# Patient Record
Sex: MALE | Race: BLACK OR AFRICAN AMERICAN | NOT HISPANIC OR LATINO | Employment: FULL TIME | URBAN - METROPOLITAN AREA
[De-identification: names, ages, dates, MRNs, and addresses within clinical notes are randomized per-mention and may not be internally consistent; named-entity substitution may affect disease eponyms.]

---

## 2023-11-27 ENCOUNTER — APPOINTMENT (OUTPATIENT)
Dept: RADIOLOGY | Facility: HOSPITAL | Age: 48
End: 2023-11-27
Payer: COMMERCIAL

## 2023-11-27 ENCOUNTER — HOSPITAL ENCOUNTER (EMERGENCY)
Facility: HOSPITAL | Age: 48
Discharge: HOME | End: 2023-11-27
Attending: EMERGENCY MEDICINE
Payer: COMMERCIAL

## 2023-11-27 VITALS
BODY MASS INDEX: 23.86 KG/M2 | RESPIRATION RATE: 16 BRPM | HEART RATE: 81 BPM | HEIGHT: 67 IN | TEMPERATURE: 98.4 F | OXYGEN SATURATION: 100 % | SYSTOLIC BLOOD PRESSURE: 132 MMHG | DIASTOLIC BLOOD PRESSURE: 86 MMHG | WEIGHT: 152 LBS

## 2023-11-27 DIAGNOSIS — M54.2 NECK PAIN WITHOUT INJURY: Primary | ICD-10-CM

## 2023-11-27 DIAGNOSIS — M54.6 ACUTE THORACIC BACK PAIN, UNSPECIFIED BACK PAIN LATERALITY: ICD-10-CM

## 2023-11-27 DIAGNOSIS — I10 DIASTOLIC HYPERTENSION: ICD-10-CM

## 2023-11-27 PROCEDURE — 2500000004 HC RX 250 GENERAL PHARMACY W/ HCPCS (ALT 636 FOR OP/ED): Performed by: EMERGENCY MEDICINE

## 2023-11-27 PROCEDURE — 2500000001 HC RX 250 WO HCPCS SELF ADMINISTERED DRUGS (ALT 637 FOR MEDICARE OP): Performed by: EMERGENCY MEDICINE

## 2023-11-27 PROCEDURE — 71046 X-RAY EXAM CHEST 2 VIEWS: CPT

## 2023-11-27 PROCEDURE — 2500000005 HC RX 250 GENERAL PHARMACY W/O HCPCS: Performed by: EMERGENCY MEDICINE

## 2023-11-27 PROCEDURE — 72050 X-RAY EXAM NECK SPINE 4/5VWS: CPT

## 2023-11-27 PROCEDURE — 96372 THER/PROPH/DIAG INJ SC/IM: CPT

## 2023-11-27 PROCEDURE — 99284 EMERGENCY DEPT VISIT MOD MDM: CPT | Mod: 25 | Performed by: EMERGENCY MEDICINE

## 2023-11-27 PROCEDURE — 94760 N-INVAS EAR/PLS OXIMETRY 1: CPT

## 2023-11-27 RX ORDER — IBUPROFEN 600 MG/1
600 TABLET ORAL EVERY 6 HOURS PRN
Qty: 20 TABLET | Refills: 0 | Status: SHIPPED | OUTPATIENT
Start: 2023-11-27 | End: 2023-12-02

## 2023-11-27 RX ORDER — METHOCARBAMOL 500 MG/1
500 TABLET, FILM COATED ORAL 3 TIMES DAILY
Qty: 15 TABLET | Refills: 0 | Status: SHIPPED | OUTPATIENT
Start: 2023-11-27 | End: 2023-12-02

## 2023-11-27 RX ORDER — ACETAMINOPHEN 325 MG/1
650 TABLET ORAL ONCE
Status: COMPLETED | OUTPATIENT
Start: 2023-11-27 | End: 2023-11-27

## 2023-11-27 RX ORDER — LIDOCAINE 50 MG/G
1 PATCH TOPICAL DAILY
Qty: 5 PATCH | Refills: 0 | Status: SHIPPED | OUTPATIENT
Start: 2023-11-27

## 2023-11-27 RX ORDER — ORPHENADRINE CITRATE 30 MG/ML
60 INJECTION INTRAMUSCULAR; INTRAVENOUS ONCE
Status: COMPLETED | OUTPATIENT
Start: 2023-11-27 | End: 2023-11-27

## 2023-11-27 RX ORDER — LIDOCAINE 560 MG/1
1 PATCH PERCUTANEOUS; TOPICAL; TRANSDERMAL ONCE
Status: DISCONTINUED | OUTPATIENT
Start: 2023-11-27 | End: 2023-11-27 | Stop reason: HOSPADM

## 2023-11-27 RX ORDER — IBUPROFEN 600 MG/1
600 TABLET ORAL ONCE
Status: COMPLETED | OUTPATIENT
Start: 2023-11-27 | End: 2023-11-27

## 2023-11-27 RX ADMIN — IBUPROFEN 600 MG: 600 TABLET, FILM COATED ORAL at 14:16

## 2023-11-27 RX ADMIN — PREDNISONE 60 MG: 10 TABLET ORAL at 14:18

## 2023-11-27 RX ADMIN — LIDOCAINE 1 PATCH: 4 PATCH TOPICAL at 14:16

## 2023-11-27 RX ADMIN — ORPHENADRINE CITRATE 60 MG: 60 INJECTION INTRAMUSCULAR; INTRAVENOUS at 14:16

## 2023-11-27 RX ADMIN — ACETAMINOPHEN 650 MG: 325 TABLET ORAL at 14:16

## 2023-11-27 ASSESSMENT — COLUMBIA-SUICIDE SEVERITY RATING SCALE - C-SSRS
2. HAVE YOU ACTUALLY HAD ANY THOUGHTS OF KILLING YOURSELF?: NO
1. IN THE PAST MONTH, HAVE YOU WISHED YOU WERE DEAD OR WISHED YOU COULD GO TO SLEEP AND NOT WAKE UP?: NO
6. HAVE YOU EVER DONE ANYTHING, STARTED TO DO ANYTHING, OR PREPARED TO DO ANYTHING TO END YOUR LIFE?: NO

## 2023-11-27 ASSESSMENT — PAIN SCALES - GENERAL: PAINLEVEL_OUTOF10: 10 - WORST POSSIBLE PAIN

## 2023-11-27 ASSESSMENT — LIFESTYLE VARIABLES
HAVE YOU EVER FELT YOU SHOULD CUT DOWN ON YOUR DRINKING: NO
REASON UNABLE TO ASSESS: NO
EVER HAD A DRINK FIRST THING IN THE MORNING TO STEADY YOUR NERVES TO GET RID OF A HANGOVER: NO
EVER FELT BAD OR GUILTY ABOUT YOUR DRINKING: NO
HAVE PEOPLE ANNOYED YOU BY CRITICIZING YOUR DRINKING: NO

## 2023-11-27 ASSESSMENT — PAIN DESCRIPTION - PAIN TYPE: TYPE: ACUTE PAIN

## 2023-11-27 ASSESSMENT — PAIN DESCRIPTION - ORIENTATION: ORIENTATION: MID

## 2023-11-27 ASSESSMENT — PAIN - FUNCTIONAL ASSESSMENT: PAIN_FUNCTIONAL_ASSESSMENT: 0-10

## 2023-11-27 ASSESSMENT — PAIN DESCRIPTION - LOCATION: LOCATION: NECK

## 2023-11-27 NOTE — ED PROVIDER NOTES
HPI   No chief complaint on file.      HPI                    No data recorded                Patient History   Past Medical History:   Diagnosis Date    Acute sinusitis, unspecified 02/15/2019    Acute non-recurrent sinusitis, unspecified location    Fever, unspecified 04/20/2017    Fever and chills    Infectious gastroenteritis and colitis, unspecified 05/28/2019    Diarrhea, infectious, adult    Nausea with vomiting, unspecified 05/28/2019    Nausea and vomiting in adult    Other conditions influencing health status 04/20/2017    History of cough    Otitis media, unspecified, bilateral 02/15/2019    Acute bilateral otitis media    Unspecified displaced fracture of fifth cervical vertebra, sequela 02/02/2018    Closed displaced fracture of fifth cervical vertebra, unspecified fracture morphology, sequela     Past Surgical History:   Procedure Laterality Date    APPENDECTOMY  02/02/2018    Appendectomy     No family history on file.  Social History     Tobacco Use    Smoking status: Not on file    Smokeless tobacco: Not on file   Substance Use Topics    Alcohol use: Not on file    Drug use: Not on file       Physical Exam   ED Triage Vitals [11/27/23 1354]   Temp Heart Rate Resp BP   36.9 °C (98.4 °F) 81 16 132/86      SpO2 Temp Source Heart Rate Source Patient Position   100 % Oral Monitor Sitting      BP Location FiO2 (%)     Left arm --       Physical Exam    ED Course & MDM   Diagnoses as of 11/27/23 1655   Neck pain without injury   Acute thoracic back pain, unspecified back pain laterality   Diastolic hypertension       Medical Decision Making    The patient is a 48-year-old male presenting to the emergency department for evaluation of neck pain and upper back pain.  The patient states that he had a remote injury, in 2013, and has had intermittent pain since then.  The patient states that he started having pain again last week.  He states that the pain radiates into the right arm at times.  The pain is worse  whenever he tries to move his neck or turn his head.  No fever or chills.  No headache or visual changes.  No chest pain or shortness of breath.  No abdominal pain.  No nausea or vomiting.  No diarrhea or constipation.  No focal motor weakness.  All pertinent positives and negatives are recorded above.  All other systems reviewed and otherwise negative.  Vital signs with diastolic hypertension but otherwise within normal limits.  Zickel exam with a well-nourished well-developed male in no acute distress.  HEENT exam within normal limits.  He does have pain with palpation of the C-spine and upper back diffusely.  No visible or palpable bony deformity.  No step-offs.  He has no gross motor, neurologic or vascular deficits on exam.  He has no evidence of airway compromise or respiratory distress.  Abdominal exam is benign.  He is able to stand and walk without assistance.  He is able to converse without assistance.  Strength is 5 out of 5 in all 4 extremities.  Sensation is intact.      Oral ibuprofen, oral acetaminophen, oral prednisone, Lidoderm patch and IM Norflex ordered.      XR chest 2 views   Final Result   No acute cardiopulmonary process.                  Signed by: Jossie Kidd 11/27/2023 4:29 PM   Dictation workstation:   [id number]      XR cervical spine complete 4-5 views   Final Result   No acute fracture. Moderate multilevel cervical spondylosis.        MACRO:   None.        Signed by: Jossie Kidd 11/27/2023 4:26 PM   Dictation workstation:   [id number]           The patient does not have any recent injury or trauma.  He does not have any visible or palpable bony deformity.  He does not have any neurologic deficits on exam.  He does not have any acute process on the chest x-ray.  He does not have evidence of pneumonia or pneumothorax.  No bony injury.  He does not have any evidence of fracture or dislocation on the cervical spine x-rays.  He does have multilevel cervical  spondylosis.      Suspect torticollis as the likely etiology of his symptoms.      The patient was released in good condition.  He was instructed to follow-up with his primary care physician within 1 to 2 days for further management of his current symptoms, repeat check of his blood pressure and to discuss a referral to physical therapy..  He was given a prescription for Lidoderm patches, ibuprofen, and Robaxin.  He was also given a referral to orthospine.  He will return to the emergency department sooner with worsening of symptoms or onset of new symptoms.    Impression/diagnosis  Neck pain, acute on chronic  Upper back pain, acute on chronic  Diastolic hypertension      I reviewed the results of the diagnostic imaging.  Formal radiology reading was completed by the radiologist.  Procedure  Procedures     Bethany Macedo MD  11/27/23 [id number]

## 2023-11-27 NOTE — DISCHARGE INSTRUCTIONS
Follow-up with your primary care physician within 1 to 2 days for further management of your current symptoms, repeat check of your blood pressure and to discuss a possible referral to physical therapy.      Follow-up with ortho spine as scheduled      Return to the emergency department sooner with worsening of symptoms or onset of new symptoms

## 2023-11-27 NOTE — Clinical Note
Aries Paul was seen and treated in our emergency department on 11/27/2023.  He may return to work on 11/28/2023.       If you have any questions or concerns, please don't hesitate to call.      Bethany Macedo MD